# Patient Record
Sex: FEMALE | Race: BLACK OR AFRICAN AMERICAN | NOT HISPANIC OR LATINO | Employment: UNEMPLOYED | URBAN - METROPOLITAN AREA
[De-identification: names, ages, dates, MRNs, and addresses within clinical notes are randomized per-mention and may not be internally consistent; named-entity substitution may affect disease eponyms.]

---

## 2021-06-02 ENCOUNTER — RECORDS - HEALTHEAST (OUTPATIENT)
Dept: ADMINISTRATIVE | Facility: CLINIC | Age: 36
End: 2021-06-02

## 2021-06-16 PROBLEM — R53.1 WEAKNESS: Status: ACTIVE | Noted: 2018-09-16

## 2021-06-16 PROBLEM — R26.2 UNABLE TO AMBULATE: Status: ACTIVE | Noted: 2018-09-16

## 2022-07-20 PROCEDURE — 99285 EMERGENCY DEPT VISIT HI MDM: CPT | Mod: 25

## 2022-07-21 ENCOUNTER — APPOINTMENT (OUTPATIENT)
Dept: ULTRASOUND IMAGING | Facility: HOSPITAL | Age: 37
End: 2022-07-21
Attending: EMERGENCY MEDICINE
Payer: COMMERCIAL

## 2022-07-21 ENCOUNTER — HOSPITAL ENCOUNTER (EMERGENCY)
Facility: HOSPITAL | Age: 37
Discharge: HOME | End: 2022-07-21
Attending: EMERGENCY MEDICINE | Admitting: EMERGENCY MEDICINE
Payer: COMMERCIAL

## 2022-07-21 VITALS
HEART RATE: 69 BPM | SYSTOLIC BLOOD PRESSURE: 101 MMHG | DIASTOLIC BLOOD PRESSURE: 62 MMHG | WEIGHT: 225 LBS | RESPIRATION RATE: 16 BRPM | BODY MASS INDEX: 39.87 KG/M2 | TEMPERATURE: 98.9 F | HEIGHT: 63 IN | OXYGEN SATURATION: 98 %

## 2022-07-21 DIAGNOSIS — J02.0 STREP THROAT: ICD-10-CM

## 2022-07-21 DIAGNOSIS — B96.89 BACTERIAL VAGINOSIS: ICD-10-CM

## 2022-07-21 DIAGNOSIS — N76.0 BACTERIAL VAGINOSIS: ICD-10-CM

## 2022-07-21 LAB
ALBUMIN UR-MCNC: NEGATIVE MG/DL
APPEARANCE UR: CLEAR
BASOPHILS # BLD AUTO: 0.1 10E3/UL (ref 0–0.2)
BASOPHILS NFR BLD AUTO: 1 %
BILIRUB UR QL STRIP: NEGATIVE
CLUE CELLS: PRESENT
COLOR UR AUTO: COLORLESS
DEPRECATED S PYO AG THROAT QL EIA: POSITIVE
EOSINOPHIL # BLD AUTO: 0.1 10E3/UL (ref 0–0.7)
EOSINOPHIL NFR BLD AUTO: 2 %
ERYTHROCYTE [DISTWIDTH] IN BLOOD BY AUTOMATED COUNT: 13.9 % (ref 10–15)
GLUCOSE UR STRIP-MCNC: NEGATIVE MG/DL
HCG SERPL-ACNC: <2 MLU/ML (ref 0–4)
HCT VFR BLD AUTO: 39.7 % (ref 35–47)
HGB BLD-MCNC: 13.7 G/DL (ref 11.7–15.7)
HGB UR QL STRIP: ABNORMAL
HOLD SPECIMEN: NORMAL
IMM GRANULOCYTES # BLD: 0 10E3/UL
IMM GRANULOCYTES NFR BLD: 0 %
KETONES UR STRIP-MCNC: NEGATIVE MG/DL
LEUKOCYTE ESTERASE UR QL STRIP: NEGATIVE
LYMPHOCYTES # BLD AUTO: 2.6 10E3/UL (ref 0.8–5.3)
LYMPHOCYTES NFR BLD AUTO: 34 %
MCH RBC QN AUTO: 30.4 PG (ref 26.5–33)
MCHC RBC AUTO-ENTMCNC: 34.5 G/DL (ref 31.5–36.5)
MCV RBC AUTO: 88 FL (ref 78–100)
MONOCYTES # BLD AUTO: 0.6 10E3/UL (ref 0–1.3)
MONOCYTES NFR BLD AUTO: 8 %
NEUTROPHILS # BLD AUTO: 4.3 10E3/UL (ref 1.6–8.3)
NEUTROPHILS NFR BLD AUTO: 55 %
NITRATE UR QL: NEGATIVE
NRBC # BLD AUTO: 0 10E3/UL
NRBC BLD AUTO-RTO: 0 /100
PH UR STRIP: 5.5 [PH] (ref 5–7)
PLATELET # BLD AUTO: 300 10E3/UL (ref 150–450)
RBC # BLD AUTO: 4.5 10E6/UL (ref 3.8–5.2)
RBC URINE: <1 /HPF
SP GR UR STRIP: 1.01 (ref 1–1.03)
SQUAMOUS EPITHELIAL: 2 /HPF
TRICHOMONAS, WET PREP: ABNORMAL
UROBILINOGEN UR STRIP-MCNC: <2 MG/DL
WBC # BLD AUTO: 7.6 10E3/UL (ref 4–11)
WBC URINE: <1 /HPF
WBC'S/HIGH POWER FIELD, WET PREP: ABNORMAL
YEAST, WET PREP: ABNORMAL

## 2022-07-21 PROCEDURE — 85025 COMPLETE CBC W/AUTO DIFF WBC: CPT | Performed by: EMERGENCY MEDICINE

## 2022-07-21 PROCEDURE — 81001 URINALYSIS AUTO W/SCOPE: CPT | Performed by: EMERGENCY MEDICINE

## 2022-07-21 PROCEDURE — 87210 SMEAR WET MOUNT SALINE/INK: CPT | Performed by: EMERGENCY MEDICINE

## 2022-07-21 PROCEDURE — 76856 US EXAM PELVIC COMPLETE: CPT

## 2022-07-21 PROCEDURE — 87880 STREP A ASSAY W/OPTIC: CPT | Performed by: EMERGENCY MEDICINE

## 2022-07-21 PROCEDURE — 87591 N.GONORRHOEAE DNA AMP PROB: CPT | Performed by: EMERGENCY MEDICINE

## 2022-07-21 PROCEDURE — 36415 COLL VENOUS BLD VENIPUNCTURE: CPT | Performed by: EMERGENCY MEDICINE

## 2022-07-21 PROCEDURE — 84702 CHORIONIC GONADOTROPIN TEST: CPT | Performed by: EMERGENCY MEDICINE

## 2022-07-21 PROCEDURE — 87491 CHLMYD TRACH DNA AMP PROBE: CPT | Performed by: EMERGENCY MEDICINE

## 2022-07-21 RX ORDER — AZITHROMYCIN 250 MG/1
TABLET, FILM COATED ORAL
Qty: 6 TABLET | Refills: 0 | Status: SHIPPED | OUTPATIENT
Start: 2022-07-21 | End: 2022-07-26

## 2022-07-21 RX ORDER — METRONIDAZOLE 7.5 MG/G
1 GEL VAGINAL DAILY
Qty: 25 G | Refills: 0 | Status: SHIPPED | OUTPATIENT
Start: 2022-07-21 | End: 2022-07-26

## 2022-07-21 NOTE — ED TRIAGE NOTES
Pt reports abdominal cramping started on Sunday and vaginal bleeding started this morning. Pt denies excessive bleeding and soaking pads. Also reports sore throat started yesterday and is worse today. Pt also reports a headache starting in April. Pt mentioned that she produces too much spinal fluids and that causes her ICP to go high and her headaches get worse. Pt had a spinal tap 3 years ago to relive the pressure.

## 2022-07-21 NOTE — ED PROVIDER NOTES
EMERGENCY DEPARTMENT ENCOUNTER            IMPRESSION:  Bacterial vaginosis  Dysfunctional uterine bleeding  Sore throat -treated for strep throat      MEDICAL DECISION MAKING:  Patient evaluated for some pelvic cramping and vaginal bleeding.  She is off cycle.     On exam her vital signs are normal.  She does not appear uncomfortable.  Oropharynx is pink and moist, there is no abdominal tenderness.  Pelvic exam showed some discharge without bleeding.  There was some mild right pelvic tenderness    She declined pain medication    Wet prep showed BV.    GC chlamydia pending    Urinalysis does not show evidence of infection    CBC is normal    Pelvic ultrasound showed a small amount of free fluid and a small fibroid.    Throat culture is positive for strep.  Patient was given a prescription for Zithromax.  She is penicillin allergic    Patient will be discharged home with MetroGel.           =================================================================  CHIEF COMPLAINT:  Chief Complaint   Patient presents with     Abdominal Cramping     Vaginal Bleeding     Headache         HPI  Lulu Higginbotham is a 37 year old female who presents to the ED by walk-in for evaluation of abdominal cramping, vaginal bleeding, headache.    Patient says she has had vaginal bleeding today after having abdominal pain since Sunday. She also says her period was last week and that she is not pregnant to her knowledge. Patient also says she has not slept much the last 2 days.      Patient offered pain medication but declined at this time.      I, Jarod Wilkinson am serving as a scribe to document services personally performed by Dr. Alphonso Lopez MD, based on my observation and the provider's statements to me. I, Dr. Alphonso Lopez MD attest that Jarod Wilkinson is acting in a scribe capacity, has observed my performance of the services and has documented them in accordance with my direction.      REVIEW OF SYSTEMS   Constitutional: Denies fever, chills,  unintentional weight loss or fatigue   Eyes: Denies visual changes or discharge    HENT: Denies sore throat, ear pain or neck pain  Respiratory: Denies cough or shortness of breath    Cardiovascular: Denies chest pain, palpitations or leg swelling  GI: Denies nausea, vomiting, or dark, bloody stools. Positive for abdominal pain.  : Denies hematuria, dysuria, or flank pain. Positive for vaginal bleeding.  Musculoskeletal: Denies any new back pain or new muscle/joint pains  Skin: Denies rash or wound  Neurologic: Denies current headache, new weakness, focal weakness, or sensory changes    Lymphatic: Denies swollen glands    Psychiatric: Denies depression, suicidal ideation or homicidal ideation.    Remainder of systems reviewed, unless noted in HPI all others negative.      PAST MEDICAL HISTORY:  History reviewed. No pertinent past medical history.    PAST SURGICAL HISTORY:  Past Surgical History:   Procedure Laterality Date     BIOPSY CERVICAL, LOCAL EXCISION, SINGLE/MULTIPLE       OVARIAN CYST SURGERY Left          CURRENT MEDICATIONS:    metroNIDAZOLE (METROGEL) 0.75 % vaginal gel  acetaminophen (TYLENOL) 500 MG tablet  albuterol (PROAIR HFA;PROVENTIL HFA;VENTOLIN HFA) 90 mcg/actuation inhaler  cyclobenzaprine (FLEXERIL) 5 MG tablet  famotidine (FOR PEPCID) 10 MG tablet  ibuprofen (ADVIL,MOTRIN) 600 MG tablet  ketoconazole (NIZORAL) 2 % shampoo  topiramate (TOPAMAX) 25 MG tablet        ALLERGIES:  Allergies   Allergen Reactions     Penicillins Anaphylaxis       FAMILY HISTORY:  Family History   Problem Relation Age of Onset     Coronary Artery Disease Other         grandmother       SOCIAL HISTORY:   Social History     Socioeconomic History     Marital status: Legally    Tobacco Use     Smoking status: Former Smoker     Types: Cigarettes, Cigarettes     Smokeless tobacco: Never Used     Tobacco comment: 2 weeks   Substance and Sexual Activity     Alcohol use: No     Drug use: Yes     Types: Marijuana      "Comment: Drug use: \"not in a while\"       PHYSICAL EXAM:    /62   Pulse 69   Temp 98.9  F (37.2  C) (Oral)   Resp 16   Ht 1.6 m (5' 3\")   Wt 102.1 kg (225 lb)   LMP 07/06/2022 (Exact Date)   SpO2 98%   Breastfeeding No   BMI 39.86 kg/m      Constitutional: Awake, alert, in no acute distress  Head: Normocephalic, atraumatic.  ENT: Mucous membranes moist. Posterior oropharynx appears normal.  Eyes: Pupils midrange and reactive ,no conjunctival discharge  Neck: No lymphadenopathy, no stridor, supple, no soft tissue swelling  Chest: No tenderness   Respiratory: Respirations even, unlabored. Lungs clear to ascultation bilaterally, in no acute respiratory distress.  Cardiovascular: Regular rate and rhythm.+2 radial pulses, equal bilaterally.  No murmurs.   GI: Abdomen soft, non-tender to palpation in all 4 quadrants. No guarding or rebound. Bowel sounds intact on all 4 quadrants.  GYN; pelvic exam performed with a nurse chaperone.  There was a small amount of clotted blood.  No cervical motion tenderness.  Minimal midline and right adnexal tenderness  Back: No CVA tenderness.    Musculoskeletal: Moves all 4 extremities equally, s  Integument: Warm, dry. No rash. No bruising or petechiae.  Neurologic: Alert & oriented x 3. Normal speech.   Psychiatric: Normal mood and affect. Normal judgement.    ED COURSE:    1:15 AM I met with patient for initial encounter.    LAB:  All pertinent labs reviewed and interpreted.  Results for orders placed or performed during the hospital encounter of 07/21/22   US Pelvic Complete w Transvaginal    Impression    IMPRESSION:  1.  1.7 x 1.3 x 1.4 cm uterine fibroid.    2.  Minimal free fluid in the left adnexa.    3.  Ovaries unremarkable. Flow present to both ovaries on color Doppler imaging.         Extra Blue Top Tube   Result Value Ref Range    Hold Specimen JIC    Extra Red Top Tube   Result Value Ref Range    Hold Specimen JIC    Extra Green Top (Lithium Heparin) Tube "   Result Value Ref Range    Hold Specimen JIC    Extra Purple Top Tube   Result Value Ref Range    Hold Specimen JIC    Extra Purple Top Tube   Result Value Ref Range    Hold Specimen JIC    HCG quantitative pregnancy   Result Value Ref Range    hCG Quantitative <2 0 - 4 mlU/mL   UA with Microscopic reflex to Culture    Specimen: Urine, Clean Catch   Result Value Ref Range    Color Urine Colorless Colorless, Straw, Light Yellow, Yellow    Appearance Urine Clear Clear    Glucose Urine Negative Negative mg/dL    Bilirubin Urine Negative Negative    Ketones Urine Negative Negative mg/dL    Specific Gravity Urine 1.014 1.001 - 1.030    Blood Urine 0.03 mg/dL (A) Negative    pH Urine 5.5 5.0 - 7.0    Protein Albumin Urine Negative Negative mg/dL    Urobilinogen Urine <2.0 <2.0 mg/dL    Nitrite Urine Negative Negative    Leukocyte Esterase Urine Negative Negative    RBC Urine <1 <=2 /HPF    WBC Urine <1 <=5 /HPF    Squamous Epithelials Urine 2 (H) <=1 /HPF   CBC with platelets and differential   Result Value Ref Range    WBC Count 7.6 4.0 - 11.0 10e3/uL    RBC Count 4.50 3.80 - 5.20 10e6/uL    Hemoglobin 13.7 11.7 - 15.7 g/dL    Hematocrit 39.7 35.0 - 47.0 %    MCV 88 78 - 100 fL    MCH 30.4 26.5 - 33.0 pg    MCHC 34.5 31.5 - 36.5 g/dL    RDW 13.9 10.0 - 15.0 %    Platelet Count 300 150 - 450 10e3/uL    % Neutrophils 55 %    % Lymphocytes 34 %    % Monocytes 8 %    % Eosinophils 2 %    % Basophils 1 %    % Immature Granulocytes 0 %    NRBCs per 100 WBC 0 <1 /100    Absolute Neutrophils 4.3 1.6 - 8.3 10e3/uL    Absolute Lymphocytes 2.6 0.8 - 5.3 10e3/uL    Absolute Monocytes 0.6 0.0 - 1.3 10e3/uL    Absolute Eosinophils 0.1 0.0 - 0.7 10e3/uL    Absolute Basophils 0.1 0.0 - 0.2 10e3/uL    Absolute Immature Granulocytes 0.0 <=0.4 10e3/uL    Absolute NRBCs 0.0 10e3/uL   Wet prep    Specimen: Vagina; Swab   Result Value Ref Range    Trichomonas Absent Absent    Yeast Absent Absent    Clue Cells Present (A) Absent    WBCs/high  power field 1+ (A) None       RADIOLOGY:  Reviewed all pertinent imaging. Please see official radiology report.  US Pelvic Complete w Transvaginal   Final Result   IMPRESSION:   1.  1.7 x 1.3 x 1.4 cm uterine fibroid.      2.  Minimal free fluid in the left adnexa.      3.  Ovaries unremarkable. Flow present to both ovaries on color Doppler imaging.                      MEDICATIONS GIVEN IN THE EMERGENCY:  Medications - No data to display        NEW PRESCRIPTIONS STARTED AT TODAY'S ER VISIT:  New Prescriptions    METRONIDAZOLE (METROGEL) 0.75 % VAGINAL GEL    Place 1 applicator (5 g) vaginally daily for 5 days          FINAL DIAGNOSIS:    ICD-10-CM    1. Bacterial vaginosis  N76.0     B96.89             At the conclusion of the encounter I discussed the results of all of the tests and the disposition. The questions were answered. The patient or family acknowledged understanding and was agreeable with the care plan.     NAME: Lulu Higginbotham  AGE: 37 year old female  YOB: 1985  MRN: [id number]  EVALUATION DATE & TIME: 7/21/2022  1:01 AM    PCP: No Ref-Primary, Physician    ED PROVIDER: BARRY Mars Hugh Riley, am serving as a scribe to document services personally performed by Dr. Alphonso Lopez based on my observation and the provider's statements to me. I, Alphonso Lopez MD attest that Jarod Wilkinson is acting in a scribe capacity, has observed my performance of the services and has documented them in accordance with my direction.    Alphonso Lopez M.D.  Emergency Medicine  [facility] EMERGENCY DEPARTMENT  [facility] [facility] [id number] [phone]  Dept: [phone]  7/21/2022         Alphonso Lopez MD  07/21/22 2606       Alphonso Lopez MD  07/21/22 [id number]       Alphonso Lopez MD  07/21/22 [id number]

## 2022-07-21 NOTE — ED NOTES
Pt reports that she has a hx of passing out while having blood drawn or iv's started. Told pt she will need to wait till we have a bed for her to be in for safety when starting the iv and drawing labs in case she dose have a syncope episode.

## 2022-07-21 NOTE — DISCHARGE INSTRUCTIONS
Your vaginal culture showed evidence of bacterial vaginosis.  You have been given a prescription for gel to treat this.  Your pregnancy test was negative.  Your ultrasound was negative.    Your strep test was positive.  You have been treated with Zithromax

## 2022-07-22 LAB
C TRACH DNA SPEC QL NAA+PROBE: NEGATIVE
N GONORRHOEA DNA SPEC QL NAA+PROBE: NEGATIVE